# Patient Record
Sex: MALE | Race: OTHER | NOT HISPANIC OR LATINO
[De-identification: names, ages, dates, MRNs, and addresses within clinical notes are randomized per-mention and may not be internally consistent; named-entity substitution may affect disease eponyms.]

---

## 2017-04-28 ENCOUNTER — APPOINTMENT (OUTPATIENT)
Dept: INTERNAL MEDICINE | Facility: CLINIC | Age: 47
End: 2017-04-28

## 2017-04-28 ENCOUNTER — OUTPATIENT (OUTPATIENT)
Dept: OUTPATIENT SERVICES | Facility: HOSPITAL | Age: 47
LOS: 1 days | Discharge: HOME | End: 2017-04-28

## 2017-04-28 VITALS
HEIGHT: 71 IN | SYSTOLIC BLOOD PRESSURE: 124 MMHG | BODY MASS INDEX: 27.16 KG/M2 | WEIGHT: 194 LBS | HEART RATE: 83 BPM | DIASTOLIC BLOOD PRESSURE: 70 MMHG

## 2017-04-28 DIAGNOSIS — Z78.9 OTHER SPECIFIED HEALTH STATUS: ICD-10-CM

## 2017-04-28 DIAGNOSIS — Z82.49 FAMILY HISTORY OF ISCHEMIC HEART DISEASE AND OTHER DISEASES OF THE CIRCULATORY SYSTEM: ICD-10-CM

## 2017-05-01 LAB
ALBUMIN SERPL-MCNC: 4 G/DL
ALBUMIN/GLOB SERPL: 1.67
ALP SERPL-CCNC: 58 IU/L
ALT SERPL-CCNC: 30 IU/L
ANION GAP SERPL CALC-SCNC: 12 MEQ/L
AST SERPL-CCNC: 24 IU/L
BASOPHILS # BLD: 0.01 TH/MM3
BASOPHILS NFR BLD: 0.2 %
BILIRUB SERPL-MCNC: 0.6 MG/DL
BUN SERPL-MCNC: 8 MG/DL
BUN/CREAT SERPL: 7.8 %
CALCIUM SERPL-MCNC: 9.5 MG/DL
CHLORIDE SERPL-SCNC: 104 MEQ/L
CHOLEST SERPL-MCNC: 213 MG/DL
CO2 SERPL-SCNC: 27 MEQ/L
CREAT SERPL-MCNC: 1.02 MG/DL
DIFFERENTIAL METHOD BLD: NORMAL
EOSINOPHIL # BLD: 0.05 TH/MM3
EOSINOPHIL NFR BLD: 0.9 %
ERYTHROCYTE [DISTWIDTH] IN BLOOD BY AUTOMATED COUNT: 14.4 %
ESTIMATED AVERGAGE GLUCOSE (NORTH): 120 MG/DL
GFR SERPL CREATININE-BSD FRML MDRD: 78
GLUCOSE SERPL-MCNC: 91 MG/DL
GRANULOCYTES # BLD: 2.78 TH/MM3
GRANULOCYTES NFR BLD: 47.8 %
HBA1C MFR BLD: 5.8 %
HCT VFR BLD AUTO: 46.9 %
HDLC SERPL-MCNC: 52 MG/DL
HDLC SERPL: 4.1
HGB BLD-MCNC: 14.8 G/DL
IMM GRANULOCYTES # BLD: 0.02 TH/MM3
IMM GRANULOCYTES NFR BLD: 0.3 %
LDLC SERPL DIRECT ASSAY-MCNC: 145 MG/DL
LYMPHOCYTES # BLD: 2.54 TH/MM3
LYMPHOCYTES NFR BLD: 43.7 %
MCH RBC QN AUTO: 28.5 PG
MCHC RBC AUTO-ENTMCNC: 31.6 G/DL
MCV RBC AUTO: 90.4 FL
MONOCYTES # BLD: 0.41 TH/MM3
MONOCYTES NFR BLD: 7.1 %
PLATELET # BLD: 238 TH/MM3
PMV BLD AUTO: 10.1 FL
POTASSIUM SERPL-SCNC: 4.5 MMOL/L
PROT SERPL-MCNC: 6.4 G/DL
RBC # BLD AUTO: 5.19 MIL/MM3
SODIUM SERPL-SCNC: 143 MEQ/L
TRIGL SERPL-MCNC: 87 MG/DL
VLDLC SERPL-MCNC: 17 MG/DL
WBC # BLD: 5.81 TH/MM3

## 2017-06-28 DIAGNOSIS — E78.00 PURE HYPERCHOLESTEROLEMIA, UNSPECIFIED: ICD-10-CM

## 2017-06-28 DIAGNOSIS — R73.9 HYPERGLYCEMIA, UNSPECIFIED: ICD-10-CM

## 2020-06-15 ENCOUNTER — EMERGENCY (EMERGENCY)
Facility: HOSPITAL | Age: 50
LOS: 0 days | Discharge: HOME | End: 2020-06-15
Attending: EMERGENCY MEDICINE | Admitting: EMERGENCY MEDICINE
Payer: SUBSIDIZED

## 2020-06-15 VITALS
RESPIRATION RATE: 16 BRPM | WEIGHT: 188.94 LBS | DIASTOLIC BLOOD PRESSURE: 76 MMHG | OXYGEN SATURATION: 96 % | HEART RATE: 85 BPM | TEMPERATURE: 98 F | SYSTOLIC BLOOD PRESSURE: 112 MMHG

## 2020-06-15 DIAGNOSIS — K08.89 OTHER SPECIFIED DISORDERS OF TEETH AND SUPPORTING STRUCTURES: ICD-10-CM

## 2020-06-15 DIAGNOSIS — K02.9 DENTAL CARIES, UNSPECIFIED: ICD-10-CM

## 2020-06-15 PROCEDURE — 99282 EMERGENCY DEPT VISIT SF MDM: CPT

## 2020-06-15 NOTE — ED ADULT NURSE NOTE - OBJECTIVE STATEMENT
50 y/o male came in complaining of left upper dental pain that started a few days ago. No swelling noted.

## 2020-06-15 NOTE — ED PROVIDER NOTE - PROGRESS NOTE DETAILS
ATTENDING NOTE: 50 y/o M here for L upper dental pain, present for a few weeks worsening over the past few days. Pt states it is food sensitive, made worse while eating. Pt has no fevers, chills or swelling. Plan: Transfer Pt to dental clinic.

## 2020-06-15 NOTE — ED PROVIDER NOTE - OBJECTIVE STATEMENT
48 y/o male presents to the ED c/o "I have left upper toothache for a couple weeks and has gotten worse the last couple days." no fever/ chills/ sore throat

## 2020-06-15 NOTE — CONSULT NOTE ADULT - SUBJECTIVE AND OBJECTIVE BOX
Patient is a 49y old  Male who presents with a chief complaint of upper left dental pain that started 3 weeks ago    BP: 110/76 P: 76 T: 98.5    HPI: Patient states he has had pain that keeps him up at night      PAST MEDICAL & SURGICAL HISTORY:  No pertinent past medical history    (   ) heart valve replacement  (   ) joint replacement  (   ) pregnancy    MEDICATIONS  (STANDING):    MEDICATIONS  (PRN):      Allergies      Intolerances        FAMILY HISTORY:      *SOCIAL HISTORY: (   ) Tobacco; (   ) ETOH    *Last Dental Visit:    Vital Signs Last 24 Hrs  T(C): 36.7 (15 Josafat 2020 12:57), Max: 36.7 (15 Josafat 2020 12:57)  T(F): 98 (15 Josafat 2020 12:57), Max: 98 (15 Josafat 2020 12:57)  HR: 85 (15 Josafat 2020 12:57) (85 - 85)  BP: 112/76 (15 Josafat 2020 12:57) (112/76 - 112/76)  BP(mean): --  RR: 16 (15 Josafat 2020 12:57) (16 - 16)  SpO2: 96% (15 Josafat 2020 12:57) (96% - 96%)    LABS:        EOE:  TMJ (   ) clicks                     (   ) pops                     (   ) crepitus             Mandible <<FROM>>             Facial bones and MOM <<grossly intact>>             (   ) trismus             (   ) lymphadenopathy             (   ) swelling             (   ) asymmetry             (   ) palpation             (   ) dyspnea             (   ) dysphagia             (   ) loss of consciousness    IOE:  <<permanent/primary/mixed>> dentition: <<grossly intact>> OR <<multiple carious teeth>> OR <<multiple missing teeth>>           hard/soft palate:  (   ) palatal torus, <<No pathology noted>>           tongue/FOM <<No pathology noted>>           labial/buccal mucosa <<No pathology noted>>           (   ) percussion           (   ) palpation           (   ) swelling            (   ) abscess           (   ) sinus tract    Dentition present: <<   >>  Mobility: <<  >>  Caries: <<  #15  >>         *DENTAL RADIOGRAPHS: 1 periapical radiograph taken    RADIOLOGY & ADDITIONAL STUDIES:    *ASSESSMENT: #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 Consent obtained. Anesthetized with 2 carpules 4% septocaine 1:100,000 epinephrine as infiltration. Elevated and extracted #15. Post op radiograph taken. Post op instructions given. Prescribed amoxicillin 500 mg and ibuprofen 600 mg.     RECOMMENDATIONS:  1) Take amoxicillin 500 mg and ibuprofen 600 mg  2) Dental F/U with outpatient dentist for comprehensive dental care.   3) If any difficulty swallowing/breathing, fever occur, return to ER.     Resident Name: Sherita Garcia

## 2020-09-18 ENCOUNTER — OUTPATIENT (OUTPATIENT)
Dept: OUTPATIENT SERVICES | Facility: HOSPITAL | Age: 50
LOS: 1 days | Discharge: HOME | End: 2020-09-18

## 2020-09-18 ENCOUNTER — OUTPATIENT (OUTPATIENT)
Dept: OUTPATIENT SERVICES | Facility: HOSPITAL | Age: 50
LOS: 1 days | Discharge: HOME | End: 2020-09-18
Payer: SUBSIDIZED

## 2020-09-18 ENCOUNTER — RESULT REVIEW (OUTPATIENT)
Age: 50
End: 2020-09-18

## 2020-09-18 ENCOUNTER — APPOINTMENT (OUTPATIENT)
Dept: INTERNAL MEDICINE | Facility: CLINIC | Age: 50
End: 2020-09-18
Payer: COMMERCIAL

## 2020-09-18 VITALS
DIASTOLIC BLOOD PRESSURE: 78 MMHG | WEIGHT: 194 LBS | HEART RATE: 63 BPM | HEIGHT: 71 IN | TEMPERATURE: 95.1 F | BODY MASS INDEX: 27.16 KG/M2 | SYSTOLIC BLOOD PRESSURE: 125 MMHG

## 2020-09-18 DIAGNOSIS — E78.5 HYPERLIPIDEMIA, UNSPECIFIED: ICD-10-CM

## 2020-09-18 DIAGNOSIS — M25.562 PAIN IN LEFT KNEE: ICD-10-CM

## 2020-09-18 DIAGNOSIS — R73.03 PREDIABETES: ICD-10-CM

## 2020-09-18 DIAGNOSIS — Z86.39 PERSONAL HISTORY OF OTHER ENDOCRINE, NUTRITIONAL AND METABOLIC DISEASE: ICD-10-CM

## 2020-09-18 DIAGNOSIS — Z23 ENCOUNTER FOR IMMUNIZATION: ICD-10-CM

## 2020-09-18 DIAGNOSIS — K64.9 UNSPECIFIED HEMORRHOIDS: ICD-10-CM

## 2020-09-18 PROCEDURE — 99214 OFFICE O/P EST MOD 30 MIN: CPT | Mod: GC

## 2020-09-18 PROCEDURE — 73562 X-RAY EXAM OF KNEE 3: CPT | Mod: 26,LT

## 2020-09-18 NOTE — HISTORY OF PRESENT ILLNESS
[FreeTextEntry1] : annual follow-up [de-identified] : 50 year-old man with a PMHx of dyslipidemia and prediabetes, who presents for an annual visit. Last clinic visit was April 2017. He complains of worsening left knee pain for 6 months, concerned for meniscal injury from playing soccer in youth. Also concerned about prolapsing hemorrhoids, seeing occasional bloody streaks in stool. No other complaints. Since that time only had ED visit for dental pain.\par \par Declines the flu shot today.

## 2020-09-18 NOTE — PLAN
Patient saw Dr. Nieves back in April for constipation. Mother of patient states that she was giving her MiraLax about two weeks ago. Patient has not had a bowel movement since Sunday. No pain present. No blood in stool. Provided mother of patient with recommendations in regards to dietary changes. Informed mother that if her symptoms do not improved after taking the MiraLax and diet changes that she should schedule an appointment with Dr. Nieves.  
Patient's mother called and states the patient is having issues with constipation.  Patient was seen in April and was advised to take Miralax for a month which helped.  After 2 weeks the constipation returned and the patient is now taking Miralax again.  Patient has not had a bowel movement since last Sunday.  Please call to discuss next steps.  Called patient again and transferred to Triage.  
[FreeTextEntry1] : \par # Health maintenance\par - Has not had colonoscopy. Refer to GI for colonoscopy\par - Declined flu shot\par - Repeat labs: CBC, BMP, HbA1C, lipid panel\par \par # Hemorrhoids\par - Refer to GI for colonoscopy\par \par # Worsening chronic L knee pain\par - L knee X-ray\par - Refer to orthopedics for further evaluation

## 2020-09-23 ENCOUNTER — APPOINTMENT (OUTPATIENT)
Dept: ORTHOPEDIC SURGERY | Facility: CLINIC | Age: 50
End: 2020-09-23

## 2020-09-23 ENCOUNTER — OUTPATIENT (OUTPATIENT)
Dept: OUTPATIENT SERVICES | Facility: HOSPITAL | Age: 50
LOS: 1 days | Discharge: HOME | End: 2020-09-23

## 2020-11-06 ENCOUNTER — APPOINTMENT (OUTPATIENT)
Dept: GASTROENTEROLOGY | Facility: CLINIC | Age: 50
End: 2020-11-06

## 2021-04-20 ENCOUNTER — APPOINTMENT (OUTPATIENT)
Dept: INTERNAL MEDICINE | Facility: CLINIC | Age: 51
End: 2021-04-20
Payer: COMMERCIAL

## 2021-04-20 ENCOUNTER — OUTPATIENT (OUTPATIENT)
Dept: OUTPATIENT SERVICES | Facility: HOSPITAL | Age: 51
LOS: 1 days | Discharge: HOME | End: 2021-04-20

## 2021-04-20 VITALS
SYSTOLIC BLOOD PRESSURE: 102 MMHG | TEMPERATURE: 97.5 F | HEART RATE: 65 BPM | DIASTOLIC BLOOD PRESSURE: 68 MMHG | OXYGEN SATURATION: 97 % | WEIGHT: 195 LBS | BODY MASS INDEX: 27.3 KG/M2 | HEIGHT: 71 IN

## 2021-04-20 DIAGNOSIS — G89.29 PAIN IN LEFT KNEE: ICD-10-CM

## 2021-04-20 DIAGNOSIS — M25.562 PAIN IN LEFT KNEE: ICD-10-CM

## 2021-04-20 LAB
ALBUMIN SERPL ELPH-MCNC: 4.8 G/DL
ALP BLD-CCNC: 67 U/L
ALT SERPL-CCNC: 21 U/L
ANION GAP SERPL CALC-SCNC: 9 MMOL/L
AST SERPL-CCNC: 22 U/L
BASOPHILS # BLD AUTO: 0.01 K/UL
BASOPHILS NFR BLD AUTO: 0.2 %
BILIRUB SERPL-MCNC: 0.2 MG/DL
BUN SERPL-MCNC: 13 MG/DL
CALCIUM SERPL-MCNC: 10.1 MG/DL
CHLORIDE SERPL-SCNC: 106 MMOL/L
CHOLEST SERPL-MCNC: 228 MG/DL
CHOLEST/HDLC SERPL: 3.9 RATIO
CO2 SERPL-SCNC: 29 MMOL/L
CREAT SERPL-MCNC: 0.9 MG/DL
EOSINOPHIL # BLD AUTO: 0.05 K/UL
EOSINOPHIL NFR BLD AUTO: 1 %
ESTIMATED AVERAGE GLUCOSE: 117 MG/DL
GLUCOSE SERPL-MCNC: 80 MG/DL
HBA1C MFR BLD HPLC: 5.7 %
HCT VFR BLD CALC: 47.3 %
HDLC SERPL-MCNC: 58 MG/DL
HGB BLD-MCNC: 14.5 G/DL
IMM GRANULOCYTES NFR BLD AUTO: 0.2 %
LDLC SERPL CALC-MCNC: 161 MG/DL
LYMPHOCYTES # BLD AUTO: 2.16 K/UL
LYMPHOCYTES NFR BLD AUTO: 44.6 %
MAN DIFF?: NORMAL
MCHC RBC-ENTMCNC: 28.3 PG
MCHC RBC-ENTMCNC: 30.7 G/DL
MCV RBC AUTO: 92.2 FL
MONOCYTES # BLD AUTO: 0.41 K/UL
MONOCYTES NFR BLD AUTO: 8.5 %
NEUTROPHILS # BLD AUTO: 2.2 K/UL
NEUTROPHILS NFR BLD AUTO: 45.5 %
PLATELET # BLD AUTO: 246 K/UL
POTASSIUM SERPL-SCNC: 5 MMOL/L
PROT SERPL-MCNC: 7.1 G/DL
RBC # BLD: 5.13 M/UL
RBC # FLD: 14 %
SODIUM SERPL-SCNC: 144 MMOL/L
TRIGL SERPL-MCNC: 114 MG/DL
WBC # FLD AUTO: 4.84 K/UL

## 2021-04-20 PROCEDURE — 99213 OFFICE O/P EST LOW 20 MIN: CPT | Mod: GC

## 2021-04-20 NOTE — ASSESSMENT
[FreeTextEntry1] : # Health maintenance\par - Has not had colonoscopy. Refer to GI for colonoscopy\par - Declined flu shot\par - Repeat labs: CBC, BMP, HbA1C, lipid panel\par \par # Hemorrhoids\par - Refer to GI for colonoscopy\par \par # Worsening chronic L knee pain\par - L knee X-ray nosignificant changes\par - Refer to orthopedics for further evaluation. \par \par RTC in 6 months and prn

## 2021-04-20 NOTE — HISTORY OF PRESENT ILLNESS
[FreeTextEntry1] : Follow up  [de-identified] : 50 year-old man with a PMHx of dyslipidemia and prediabetes, who presents for an annual visit. Since last visit patient states that his left knee has improved. He continues to perform physical therapy and limit overuse.  He continues to experience pain with prolapsing hemorrhoids, seeing occasional bloody streaks in stool. Otherwise patient has been fairly healthy since last visit aside for being infected with Covid in February but did not require hospitalization. He denies chest pain, sob, fatigue, nausea, and diarrhea. \par

## 2021-04-26 DIAGNOSIS — E78.5 HYPERLIPIDEMIA, UNSPECIFIED: ICD-10-CM

## 2021-04-26 DIAGNOSIS — R73.03 PREDIABETES: ICD-10-CM

## 2021-04-26 DIAGNOSIS — M25.562 PAIN IN LEFT KNEE: ICD-10-CM

## 2021-04-26 DIAGNOSIS — K64.9 UNSPECIFIED HEMORRHOIDS: ICD-10-CM

## 2021-06-04 ENCOUNTER — APPOINTMENT (OUTPATIENT)
Dept: GASTROENTEROLOGY | Facility: CLINIC | Age: 51
End: 2021-06-04

## 2021-07-23 ENCOUNTER — OUTPATIENT (OUTPATIENT)
Dept: OUTPATIENT SERVICES | Facility: HOSPITAL | Age: 51
LOS: 1 days | Discharge: HOME | End: 2021-07-23

## 2021-07-23 ENCOUNTER — APPOINTMENT (OUTPATIENT)
Dept: GASTROENTEROLOGY | Facility: CLINIC | Age: 51
End: 2021-07-23
Payer: COMMERCIAL

## 2021-07-23 ENCOUNTER — NON-APPOINTMENT (OUTPATIENT)
Age: 51
End: 2021-07-23

## 2021-07-23 VITALS
DIASTOLIC BLOOD PRESSURE: 66 MMHG | HEART RATE: 73 BPM | SYSTOLIC BLOOD PRESSURE: 101 MMHG | OXYGEN SATURATION: 97 % | BODY MASS INDEX: 27.79 KG/M2 | TEMPERATURE: 97.9 F | WEIGHT: 198.5 LBS | HEIGHT: 71 IN

## 2021-07-23 DIAGNOSIS — Z78.9 OTHER SPECIFIED HEALTH STATUS: ICD-10-CM

## 2021-07-23 DIAGNOSIS — K64.9 UNSPECIFIED HEMORRHOIDS: ICD-10-CM

## 2021-07-23 DIAGNOSIS — Z12.11 ENCOUNTER FOR SCREENING FOR MALIGNANT NEOPLASM OF COLON: ICD-10-CM

## 2021-07-23 PROCEDURE — 99204 OFFICE O/P NEW MOD 45 MIN: CPT | Mod: GC

## 2021-07-23 RX ORDER — HYDROCORTISONE 25 MG/G
2.5 CREAM TOPICAL DAILY
Qty: 1 | Refills: 1 | Status: ACTIVE | COMMUNITY
Start: 2021-07-23 | End: 1900-01-01

## 2021-07-23 NOTE — HISTORY OF PRESENT ILLNESS
[Heartburn] : denies heartburn [Nausea] : denies nausea [Vomiting] : denies vomiting [Diarrhea] : denies diarrhea [Constipation] : denies constipation [Abdominal Pain] : denies abdominal pain [de-identified] : a 50 yo man with hx of DLD, preDM is here for CRC screening \par no family hx of CRC screening\par no colonoscopy before\par \par patient with blood per rectum few teaspoon when having hard stool and also has anal pain then\par no weight loss\par \par

## 2021-07-23 NOTE — ASSESSMENT
[FreeTextEntry1] : *CRC screening\par no family hx\par no blood thinners\par \par will schedule next available\par \par *Hemorrhoids\par will send anusol cream\par sitz bath\par \par *hep C screening\par check antibodies \par \par

## 2021-07-23 NOTE — PHYSICAL EXAM
[General Appearance - Alert] : alert [General Appearance - In No Acute Distress] : in no acute distress [PERRL With Normal Accommodation] : pupils were equal in size, round, and reactive to light [Hearing Threshold Finger Rub Not Stevens] : hearing was normal [Jugular Venous Distention Increased] : there was no jugular-venous distention [Auscultation Breath Sounds / Voice Sounds] : lungs were clear to auscultation bilaterally [Heart Sounds] : normal S1 and S2 [Edema] : there was no peripheral edema [Bowel Sounds] : normal bowel sounds [Abdomen Soft] : soft [Abdomen Tenderness] : non-tender [Abnormal Walk] : normal gait [Skin Turgor] : normal skin turgor [Oriented To Time, Place, And Person] : oriented to person, place, and time

## 2021-07-23 NOTE — REVIEW OF SYSTEMS
[As Noted in HPI] : as noted in HPI [Fever] : no fever [Chills] : no chills [Eye Pain] : no eye pain [Red Eyes] : eyes not red [Earache] : no earache [Loss Of Hearing] : no hearing loss [Chest Pain] : no chest pain [Palpitations] : no palpitations [Cough] : no cough [SOB on Exertion] : no shortness of breath during exertion

## 2021-09-20 ENCOUNTER — LABORATORY RESULT (OUTPATIENT)
Age: 51
End: 2021-09-20

## 2021-09-20 ENCOUNTER — OUTPATIENT (OUTPATIENT)
Dept: OUTPATIENT SERVICES | Facility: HOSPITAL | Age: 51
LOS: 1 days | Discharge: HOME | End: 2021-09-20

## 2021-09-20 DIAGNOSIS — Z11.59 ENCOUNTER FOR SCREENING FOR OTHER VIRAL DISEASES: ICD-10-CM

## 2021-09-23 ENCOUNTER — RESULT REVIEW (OUTPATIENT)
Age: 51
End: 2021-09-23

## 2021-09-23 ENCOUNTER — OUTPATIENT (OUTPATIENT)
Dept: OUTPATIENT SERVICES | Facility: HOSPITAL | Age: 51
LOS: 1 days | Discharge: HOME | End: 2021-09-23
Payer: SUBSIDIZED

## 2021-09-23 ENCOUNTER — TRANSCRIPTION ENCOUNTER (OUTPATIENT)
Age: 51
End: 2021-09-23

## 2021-09-23 VITALS
HEART RATE: 66 BPM | TEMPERATURE: 98 F | RESPIRATION RATE: 18 BRPM | DIASTOLIC BLOOD PRESSURE: 73 MMHG | SYSTOLIC BLOOD PRESSURE: 114 MMHG | OXYGEN SATURATION: 96 %

## 2021-09-23 VITALS
RESPIRATION RATE: 18 BRPM | TEMPERATURE: 98 F | DIASTOLIC BLOOD PRESSURE: 92 MMHG | SYSTOLIC BLOOD PRESSURE: 129 MMHG | WEIGHT: 192.9 LBS | HEART RATE: 75 BPM

## 2021-09-23 DIAGNOSIS — Z98.890 OTHER SPECIFIED POSTPROCEDURAL STATES: Chronic | ICD-10-CM

## 2021-09-23 PROCEDURE — 88305 TISSUE EXAM BY PATHOLOGIST: CPT | Mod: 26

## 2021-09-23 PROCEDURE — 45380 COLONOSCOPY AND BIOPSY: CPT

## 2021-09-23 NOTE — ASU DISCHARGE PLAN (ADULT/PEDIATRIC) - ASU DC SPECIAL INSTRUCTIONSFT
Follow up with our [facility] located at [address] [place] [address]. Phone Number: [phone]

## 2021-09-23 NOTE — CHART NOTE - NSCHARTNOTEFT_GEN_A_CORE
PACU ANESTHESIA ADMISSION NOTE      Procedure: Colonoscopy with Bx  Post op diagnosis:      ____  Intubated  TV:______       Rate: ______      FiO2: ______    _x___  Patent Airway    _x___  Full return of protective reflexes    _x___  Full recovery from anesthesia / back to baseline status    Vitals:            T:    97.6            BP :  119/77              R: 14             Sat:  98%             P: 74      Mental Status:  _x___ Awake   _____ Alert   _____ Drowsy   _____ Sedated    Nausea/Vomiting:  _x___  NO       ______Yes,   See Post - Op Orders         Pain Scale (0-10):  __0___    Treatment: _x___ None    ____ See Post - Op/PCA Orders    Post - Operative Fluids:   __x__ Oral   ____ See Post - Op Orders    Plan: Discharge:   _x___Home       _____Floor     _____Critical Care    _____  Other:_________________    Comments:  No anesthesia issues or complications noted.  Discharge when criteria met.

## 2021-09-23 NOTE — H&P PST ADULT - HISTORY OF PRESENT ILLNESS
a 52 yo man with hx of DLD, preDM is here for colonoscopy for CRC screening   no family hx of CRC    no colonoscopy before    patient with blood per rectum few teaspoon when having hard stool and also has anal pain then  no weight loss

## 2021-09-23 NOTE — ASU DISCHARGE PLAN (ADULT/PEDIATRIC) - CALL YOUR DOCTOR IF YOU HAVE ANY OF THE FOLLOWING:
Bleeding that does not stop/Swelling that gets worse/Pain not relieved by Medications/Fever greater than (need to indicate Fahrenheit or Celsius)/Inability to tolerate liquids or foods

## 2021-09-23 NOTE — PRE-ANESTHESIA EVALUATION ADULT - MALLAMPATI CLASS
[FreeTextEntry1] : Mr. Glass is [age] years old, his wife is 67 and about five years ago his erection started becoming less easy to obtain. During that time his wife has her own issues and develop some dyspareunia said even when they did go for sex she was not always a willing partner. They were having sex less often and about one perhaps two years ago his penis really wasn’t working well. He saw Dr. Chandra his primary care doctor gave him sildenafil. He took up to 60 mg, I’m not sure why he didn’t take more. When his wife found out that he was using them she didn’t want to have sex at all as she was concerned re-the side effects on his health. Even with the pills now his erections don’t show up and he was sent here to see what we can do.\par \par He also sees Dr. Chaney who he says found some changes in his heart but told him it was nothing major. He has no chest pain he has no trouble voiding and though he wakes up at least three times a night and is with large volumes each time.\par  [Nocturia] : nocturia [Erectile Dysfunction] : Erectile Dysfunction Class II - visualization of the soft palate, fauces, and uvula

## 2021-09-23 NOTE — PRE-ANESTHESIA EVALUATION ADULT - NSANTHOSAYNRD_GEN_A_CORE
denies/No. RANDALL screening performed.  STOP BANG Legend: 0-2 = LOW Risk; 3-4 = INTERMEDIATE Risk; 5-8 = HIGH Risk

## 2021-09-27 LAB — SURGICAL PATHOLOGY STUDY: SIGNIFICANT CHANGE UP

## 2021-09-30 DIAGNOSIS — D12.3 BENIGN NEOPLASM OF TRANSVERSE COLON: ICD-10-CM

## 2021-09-30 DIAGNOSIS — Z12.11 ENCOUNTER FOR SCREENING FOR MALIGNANT NEOPLASM OF COLON: ICD-10-CM

## 2021-09-30 DIAGNOSIS — K64.8 OTHER HEMORRHOIDS: ICD-10-CM

## 2021-11-24 NOTE — ASU PREOP CHECKLIST - DNR CLARIFICATION FORM COMPLETED
Please inform patient that test result showed an improvement in labs of the patient.   Thank you.     Charisse Contreras M.D.    n/a

## 2021-12-03 ENCOUNTER — NON-APPOINTMENT (OUTPATIENT)
Age: 51
End: 2021-12-03

## 2021-12-03 ENCOUNTER — OUTPATIENT (OUTPATIENT)
Dept: OUTPATIENT SERVICES | Facility: HOSPITAL | Age: 51
LOS: 1 days | Discharge: HOME | End: 2021-12-03

## 2021-12-03 ENCOUNTER — APPOINTMENT (OUTPATIENT)
Dept: INTERNAL MEDICINE | Facility: CLINIC | Age: 51
End: 2021-12-03
Payer: COMMERCIAL

## 2021-12-03 VITALS
WEIGHT: 197 LBS | OXYGEN SATURATION: 97 % | HEIGHT: 71 IN | SYSTOLIC BLOOD PRESSURE: 103 MMHG | BODY MASS INDEX: 27.58 KG/M2 | HEART RATE: 76 BPM | DIASTOLIC BLOOD PRESSURE: 70 MMHG | TEMPERATURE: 98 F

## 2021-12-03 DIAGNOSIS — E78.5 HYPERLIPIDEMIA, UNSPECIFIED: ICD-10-CM

## 2021-12-03 DIAGNOSIS — Z98.890 OTHER SPECIFIED POSTPROCEDURAL STATES: Chronic | ICD-10-CM

## 2021-12-03 DIAGNOSIS — Z00.00 ENCOUNTER FOR GENERAL ADULT MEDICAL EXAMINATION WITHOUT ABNORMAL FINDINGS: ICD-10-CM

## 2021-12-03 DIAGNOSIS — R73.03 PREDIABETES: ICD-10-CM

## 2021-12-03 PROCEDURE — 99213 OFFICE O/P EST LOW 20 MIN: CPT | Mod: GC

## 2021-12-03 NOTE — ASSESSMENT
[FreeTextEntry1] : 50 year-old man here for annual follow-up.\par \par # Prediabetes\par - A1C 5.7%\par - counselled on dietary and lifestyle modifications\par \par # Hypercholesterolemia\par - counselled on dietary modification\par - ASVCD 3.3 %\par \par # [facility]\par colonsoscopy in 2021 > tubular adenoma removed, will have to repeat in 2026\par rto in 1 yr and prn\par \par

## 2021-12-03 NOTE — REVIEW OF SYSTEMS
[Fever] : no fever [Chills] : no chills [Discharge] : no discharge [Pain] : no pain [Earache] : no earache [Hearing Loss] : no hearing loss [Chest Pain] : no chest pain [Palpitations] : no palpitations [Shortness Of Breath] : no shortness of breath [Wheezing] : no wheezing [Abdominal Pain] : no abdominal pain [Nausea] : no nausea [Vomiting] : no vomiting [Dysuria] : no dysuria [Incontinence] : no incontinence [Joint Pain] : no joint pain [Joint Stiffness] : no joint stiffness [Back Pain] : no back pain [Itching] : no itching [Mole Changes] : no mole changes [Skin Rash] : no skin rash

## 2021-12-03 NOTE — HISTORY OF PRESENT ILLNESS
[FreeTextEntry1] : annual follow-up [de-identified] : 50 year-old man with a PMHx of dyslipidemia and prediabetes, who presents for an annual visit. \par \par In the interim patient had colonoscopy with GI and was fopund to have one tubular adenoma that was removed. he reports no new complaints or issues today. \par \par Declines the flu shot today.

## 2021-12-03 NOTE — PHYSICAL EXAM
[No Acute Distress] : no acute distress [Well Nourished] : well nourished [Normal Sclera/Conjunctiva] : normal sclera/conjunctiva [PERRL] : pupils equal round and reactive to light [Normal Outer Ear/Nose] : the outer ears and nose were normal in appearance [Normal Oropharynx] : the oropharynx was normal [No JVD] : no jugular venous distention [No Lymphadenopathy] : no lymphadenopathy [No Respiratory Distress] : no respiratory distress  [No Accessory Muscle Use] : no accessory muscle use [Normal Rate] : normal rate  [Regular Rhythm] : with a regular rhythm [No Carotid Bruits] : no carotid bruits [No Edema] : there was no peripheral edema

## 2022-06-24 ENCOUNTER — OUTPATIENT (OUTPATIENT)
Dept: OUTPATIENT SERVICES | Facility: HOSPITAL | Age: 52
LOS: 1 days | Discharge: HOME | End: 2022-06-24

## 2022-06-24 ENCOUNTER — APPOINTMENT (OUTPATIENT)
Dept: GASTROENTEROLOGY | Facility: CLINIC | Age: 52
End: 2022-06-24

## 2022-06-24 ENCOUNTER — NON-APPOINTMENT (OUTPATIENT)
Age: 52
End: 2022-06-24

## 2022-06-24 VITALS
BODY MASS INDEX: 27.58 KG/M2 | OXYGEN SATURATION: 98 % | DIASTOLIC BLOOD PRESSURE: 63 MMHG | HEART RATE: 74 BPM | HEIGHT: 71 IN | SYSTOLIC BLOOD PRESSURE: 99 MMHG | TEMPERATURE: 98 F | WEIGHT: 197 LBS

## 2022-06-24 DIAGNOSIS — Z12.11 ENCOUNTER FOR SCREENING FOR MALIGNANT NEOPLASM OF COLON: ICD-10-CM

## 2022-06-24 DIAGNOSIS — Z98.890 OTHER SPECIFIED POSTPROCEDURAL STATES: Chronic | ICD-10-CM

## 2022-06-24 DIAGNOSIS — K62.5 HEMORRHAGE OF ANUS AND RECTUM: ICD-10-CM

## 2022-06-24 DIAGNOSIS — K64.9 UNSPECIFIED HEMORRHOIDS: ICD-10-CM

## 2022-06-24 PROCEDURE — 99212 OFFICE O/P EST SF 10 MIN: CPT | Mod: GC

## 2022-06-24 NOTE — HISTORY OF PRESENT ILLNESS
[Heartburn] : denies heartburn [Nausea] : denies nausea [Vomiting] : denies vomiting [Diarrhea] : denies diarrhea [Constipation] : denies constipation [Abdominal Pain] : denies abdominal pain [de-identified] : 51 yo man with hx of DLD, preDM, and hemorrhoids is here for follow up of his screening colonoscopy. No FHx of colon cancer screening, and no prior colonoscopies. \par \par On prior visit, patient had  blood per rectum (few teaspoons) when having hard stool and straining, and also had anal pain but no weight loss. Found to have hemorrhoids. \par \par This visit he is here to review colonoscopy results. He has no new complains or any red flag signs or symptoms. \par Colonoscopy 9/23/21: 3-5mm pseudopolyp in cecum, 6mm tubular adenoma in transverse, external hemorrhoids. \par Repeat in 5 years\par \par

## 2022-06-24 NOTE — PHYSICAL EXAM
[General Appearance - Alert] : alert [General Appearance - In No Acute Distress] : in no acute distress [General Appearance - Well Developed] : well developed [Sclera] : the sclera and conjunctiva were normal [Outer Ear] : the ears and nose were normal in appearance [Oropharynx] : the oropharynx was normal [Neck Appearance] : the appearance of the neck was normal [Respiration, Rhythm And Depth] : normal respiratory rhythm and effort [Auscultation Breath Sounds / Voice Sounds] : lungs were clear to auscultation bilaterally [Apical Impulse] : the apical impulse was normal [Heart Rate And Rhythm] : heart rate was normal and rhythm regular [Heart Sounds] : normal S1 and S2 [Murmurs] : no murmurs [Edema] : there was no peripheral edema [Abdomen Soft] : soft [Abdomen Tenderness] : non-tender [] : no hepato-splenomegaly [No Spinal Tenderness] : no spinal tenderness [Abnormal Walk] : normal gait [Oriented To Time, Place, And Person] : oriented to person, place, and time [Affect] : the affect was normal

## 2022-06-24 NOTE — REVIEW OF SYSTEMS
[Fever] : no fever [Chills] : no chills [Eye Pain] : no eye pain [Cough] : no cough [SOB on Exertion] : no shortness of breath during exertion [Dysuria] : no dysuria [Arthralgias] : no arthralgias [Dizziness] : no dizziness

## 2022-06-24 NOTE — ASSESSMENT
[FreeTextEntry1] : 53 yo man with hx of DLD, preDM, and hemorrhoids is here for follow up of his screening colonoscopy. No FHx of colon cancer screening, and no prior colonoscopies. \par \par *CRC screening\par - Colonoscopy 9/23/21: 3-5mm pseudopolyp in cecum, 6mm tubular adenoma in transverse (both removed), external hemorrhoids. \par Repeat in 5 years\par - no issues this visit, no red flag signs or symptoms, and no complains\par \par *External hemorrhoids\par Sent anusol cream last visit\par sitz bath\par - This visit pt reported that his episode of BRBPR resolved, and he did not need to use the anusol cream\par \par *hep C screening\par check antibodies -> Still not collected\par - Re-ordered hep C ab w/ reflex PCR\par \par RTC in 3 months with repeat labs\par \par

## 2022-06-24 NOTE — END OF VISIT
[] : Resident [Time Spent: ___ minutes] : I have spent [unfilled] minutes of time on the encounter. [FreeTextEntry3] : 53yo male presents for follow up after colonoscopy. Results reviewed and discussed with pt. No further rectal bleeding. Recommend check labs including cbc and hepatitis C. Discussed high fiber diet. Recommend repeat colonoscopy in 5 years for surveillance, sooner if new symptoms develop. Follow up 2-3 months.  [FreeTextEntry2] : 5

## 2022-06-27 ENCOUNTER — NON-APPOINTMENT (OUTPATIENT)
Age: 52
End: 2022-06-27

## 2022-06-27 LAB
ALBUMIN SERPL ELPH-MCNC: 4.5 G/DL
ALP BLD-CCNC: 73 U/L
ALT SERPL-CCNC: 22 U/L
ANION GAP SERPL CALC-SCNC: 11 MMOL/L
AST SERPL-CCNC: 22 U/L
BASOPHILS # BLD AUTO: 0.01 K/UL
BASOPHILS NFR BLD AUTO: 0.2 %
BILIRUB SERPL-MCNC: 0.4 MG/DL
BUN SERPL-MCNC: 14 MG/DL
CALCIUM SERPL-MCNC: 9.7 MG/DL
CHLORIDE SERPL-SCNC: 105 MMOL/L
CHOLEST SERPL-MCNC: 211 MG/DL
CO2 SERPL-SCNC: 25 MMOL/L
CREAT SERPL-MCNC: 1 MG/DL
EGFR: 89 ML/MIN/1.73M2
EOSINOPHIL # BLD AUTO: 0.06 K/UL
EOSINOPHIL NFR BLD AUTO: 1.1 %
ESTIMATED AVERAGE GLUCOSE: 114 MG/DL
GLUCOSE SERPL-MCNC: 87 MG/DL
HBA1C MFR BLD HPLC: 5.6 %
HCT VFR BLD CALC: 46.5 %
HCV AB SER QL: NONREACTIVE
HCV S/CO RATIO: 0.13 S/CO
HDLC SERPL-MCNC: 64 MG/DL
HGB BLD-MCNC: 14.3 G/DL
IMM GRANULOCYTES NFR BLD AUTO: 0.4 %
LDLC SERPL CALC-MCNC: 127 MG/DL
LYMPHOCYTES # BLD AUTO: 2.46 K/UL
LYMPHOCYTES NFR BLD AUTO: 44.8 %
MAN DIFF?: NORMAL
MCHC RBC-ENTMCNC: 28 PG
MCHC RBC-ENTMCNC: 30.8 G/DL
MCV RBC AUTO: 91.2 FL
MONOCYTES # BLD AUTO: 0.53 K/UL
MONOCYTES NFR BLD AUTO: 9.7 %
NEUTROPHILS # BLD AUTO: 2.41 K/UL
NEUTROPHILS NFR BLD AUTO: 43.8 %
NONHDLC SERPL-MCNC: 147 MG/DL
PLATELET # BLD AUTO: 269 K/UL
POTASSIUM SERPL-SCNC: 4.8 MMOL/L
PROT SERPL-MCNC: 6.9 G/DL
RBC # BLD: 5.1 M/UL
RBC # FLD: 14.2 %
SODIUM SERPL-SCNC: 141 MMOL/L
TRIGL SERPL-MCNC: 98 MG/DL
TSH SERPL-ACNC: 3.26 UIU/ML
WBC # FLD AUTO: 5.49 K/UL

## 2022-12-05 ENCOUNTER — OUTPATIENT (OUTPATIENT)
Dept: OUTPATIENT SERVICES | Facility: HOSPITAL | Age: 52
LOS: 1 days | Discharge: HOME | End: 2022-12-05

## 2022-12-05 ENCOUNTER — APPOINTMENT (OUTPATIENT)
Dept: INTERNAL MEDICINE | Facility: CLINIC | Age: 52
End: 2022-12-05
Payer: SUBSIDIZED

## 2022-12-05 VITALS
OXYGEN SATURATION: 98 % | DIASTOLIC BLOOD PRESSURE: 68 MMHG | SYSTOLIC BLOOD PRESSURE: 112 MMHG | WEIGHT: 200 LBS | HEIGHT: 71 IN | BODY MASS INDEX: 28 KG/M2 | HEART RATE: 71 BPM | TEMPERATURE: 97 F

## 2022-12-05 DIAGNOSIS — R73.03 PREDIABETES.: ICD-10-CM

## 2022-12-05 DIAGNOSIS — Z98.890 OTHER SPECIFIED POSTPROCEDURAL STATES: Chronic | ICD-10-CM

## 2022-12-05 DIAGNOSIS — Z00.00 ENCOUNTER FOR GENERAL ADULT MEDICAL EXAMINATION W/OUT ABNORMAL FINDINGS: ICD-10-CM

## 2022-12-05 DIAGNOSIS — E78.5 HYPERLIPIDEMIA, UNSPECIFIED: ICD-10-CM

## 2022-12-05 PROCEDURE — 99213 OFFICE O/P EST LOW 20 MIN: CPT | Mod: GC

## 2022-12-05 RX ORDER — POLYETHYLENE GLYCOL 3350 AND ELECTROLYTES WITH LEMON FLAVOR 236; 22.74; 6.74; 5.86; 2.97 G/4L; G/4L; G/4L; G/4L; G/4L
236 POWDER, FOR SOLUTION ORAL
Qty: 1 | Refills: 0 | Status: DISCONTINUED | COMMUNITY
Start: 2021-07-23 | End: 2022-12-05

## 2022-12-05 NOTE — HISTORY OF PRESENT ILLNESS
[FreeTextEntry1] : routine followup  [de-identified] : 52 y M with PMHx of DLD and preDM presenting for annual visit. \par Reports no acute complaints. \par \par Reports that symptoms of constipation and hemorrhoids from the past are resolving.

## 2022-12-05 NOTE — PHYSICAL EXAM
[No Acute Distress] : no acute distress [Well Nourished] : well nourished [Normal Sclera/Conjunctiva] : normal sclera/conjunctiva [No Respiratory Distress] : no respiratory distress  [No Accessory Muscle Use] : no accessory muscle use [Normal Rate] : normal rate  [Regular Rhythm] : with a regular rhythm [Normal S1, S2] : normal S1 and S2 [Soft] : abdomen soft [Non Tender] : non-tender [Non-distended] : non-distended [Normal Affect] : the affect was normal

## 2022-12-05 NOTE — ASSESSMENT
[FreeTextEntry1] : 52 y M here for annual followup. \par \par # Pre-DM - resolved \par - A1C 6/2022: 5.6 \par - c/w current lifestyle and diet changes \par \par # DLD \par - Lipids 6/2022: Cholesterol 211, TG 98, HDL 64,  \par - ASCVD <5%, no indication for statin at this time \par - recommended dietary changes, reducing animal products \par \par # External Hemorrhoids \par - resolving and follows up with GI \par \par # Health Care Maintinence \par - Colonoscopy- done in 2021; tubular adenoma removed, repeat in 2026\par - Flu Shot - declining \par - Covid vaccine - patient reports he's vaccinated

## 2022-12-08 DIAGNOSIS — E78.5 HYPERLIPIDEMIA, UNSPECIFIED: ICD-10-CM

## 2022-12-08 DIAGNOSIS — Z00.00 ENCOUNTER FOR GENERAL ADULT MEDICAL EXAMINATION WITHOUT ABNORMAL FINDINGS: ICD-10-CM

## 2022-12-08 DIAGNOSIS — R73.03 PREDIABETES: ICD-10-CM

## 2023-12-04 ENCOUNTER — APPOINTMENT (OUTPATIENT)
Dept: INTERNAL MEDICINE | Facility: CLINIC | Age: 53
End: 2023-12-04

## 2024-03-15 ENCOUNTER — APPOINTMENT (OUTPATIENT)
Dept: INTERNAL MEDICINE | Facility: CLINIC | Age: 54
End: 2024-03-15

## 2024-12-04 NOTE — ED PROVIDER NOTE - SKIN, MLM
Detail Level: Detailed Depth Of Biopsy: dermis Was A Bandage Applied: Yes Size Of Lesion In Cm: 0.5 X Size Of Lesion In Cm: 0 Biopsy Type: H and E Biopsy Method: Personna blade Anesthesia Type: 0.5% lidocaine without epinephrine Hemostasis: Aluminum Chloride Skin normal color for race, warm, dry and intact. No evidence of rash. Wound Care: Aquaphor Dressing: bandage Destruction After The Procedure: No Type Of Destruction Used: Curettage Curettage Text: The wound bed was treated with curettage after the biopsy was performed. Cryotherapy Text: The wound bed was treated with cryotherapy after the biopsy was performed. Electrodesiccation Text: The wound bed was treated with electrodesiccation after the biopsy was performed. Electrodesiccation And Curettage Text: The wound bed was treated with electrodesiccation and curettage after the biopsy was performed. Silver Nitrate Text: The wound bed was treated with silver nitrate after the biopsy was performed. Lab: [id number] Lab Facility: [id number] Consent: Written consent was obtained and risks were reviewed including but not limited to scarring, infection, bleeding, scabbing, incomplete removal, nerve damage and allergy to anesthesia. Post-Care Instructions: I reviewed with the patient in detail post-care instructions. Patient is to keep the biopsy site dry overnight, and then apply Aquaphor twice daily until healed. Notification Instructions: Patient will be notified of biopsy results. However, patient instructed to call the office if not contacted within 2 weeks. Billing Type: Third-Party Bill Information: Selecting Yes will display possible errors in your note based on the variables you have selected. This validation is only offered as a suggestion for you. PLEASE NOTE THAT THE VALIDATION TEXT WILL BE REMOVED WHEN YOU FINALIZE YOUR NOTE. IF YOU WANT TO FAX A PRELIMINARY NOTE YOU WILL NEED TO TOGGLE THIS TO 'NO' IF YOU DO NOT WANT IT IN YOUR FAXED NOTE.
